# Patient Record
Sex: FEMALE | Race: BLACK OR AFRICAN AMERICAN | NOT HISPANIC OR LATINO | Employment: UNEMPLOYED | URBAN - METROPOLITAN AREA
[De-identification: names, ages, dates, MRNs, and addresses within clinical notes are randomized per-mention and may not be internally consistent; named-entity substitution may affect disease eponyms.]

---

## 2022-01-04 ENCOUNTER — TREATMENT (OUTPATIENT)
Dept: PHYSICAL THERAPY | Facility: CLINIC | Age: 62
End: 2022-01-04

## 2022-01-04 DIAGNOSIS — M25.561 PAIN IN BOTH KNEES, UNSPECIFIED CHRONICITY: ICD-10-CM

## 2022-01-04 DIAGNOSIS — M25.511 ACUTE PAIN OF BOTH SHOULDERS: Primary | ICD-10-CM

## 2022-01-04 DIAGNOSIS — M25.559 PAIN, HIP: ICD-10-CM

## 2022-01-04 DIAGNOSIS — M25.512 ACUTE PAIN OF BOTH SHOULDERS: Primary | ICD-10-CM

## 2022-01-04 DIAGNOSIS — M25.562 PAIN IN BOTH KNEES, UNSPECIFIED CHRONICITY: ICD-10-CM

## 2022-01-04 PROCEDURE — 97162 PT EVAL MOD COMPLEX 30 MIN: CPT | Performed by: PHYSICAL THERAPIST

## 2022-01-04 PROCEDURE — 97110 THERAPEUTIC EXERCISES: CPT | Performed by: PHYSICAL THERAPIST

## 2022-01-04 NOTE — PROGRESS NOTES
Physical Therapy Initial Evaluation and Plan of Care      Patient: Yaneth Crowell   : 1960  Diagnosis/ICD-10 Code:  Acute pain of both shoulders [M25.511, M25.512]  Referring practitioner: Eron Padilla Jr., MD  Date of Initial Visit: 2022  Today's Date: 2022  Patient seen for 1 sessions           Subjective Evaluation    History of Present Illness  Date of onset: 2021 (exacerbation insidious)  Mechanism of injury: Joint pain from RA  Shoulders LEFT > RIGHT and knees are the worst    RA  28 years   Then hips ankles wrist   Today is a good day   Takes meds  Pain pills and humira   Wants a exercise program to do in the water to help managme sherie  Has never done water exercise   Diabetic   Has neuropathy in feet from diabetes  Nothing in fingers  No falls but notices balance is decreasing  With quick turns   High blood pressure controlled with meds   Spine is good   Have not able to exercise bacause of RA     Subjective comment: joint pain from RA  Patient Occupation: at home moving around all day not sitting too much  Quality of life: good    Pain  Current pain ratin  At best pain ratin  At worst pain ratin  Location: shoulder flare up last wekk   Quality: dull ache, radiating, sharp and throbbing  Relieving factors: rest, heat and medications  Aggravating factors: overhead activity, stairs, repetitive movement and movement  Progression: no change (good days  bad days )    Social Support  Lives in: one-story house  Lives with: significant other and young children    Hand dominance: right    Diagnostic Tests  No diagnostic tests performed    Treatments  Current treatment: medication  Patient Goals  Patient goals for therapy: decreased pain, increased strength, return to sport/leisure activities and improved balance  Patient goal: do things around the house without exacerbation            Objective          Static Posture     Comments  POSTURE  protracted scapular bilaterally;   Increased CT junction;   RIGHT knee hyperextension LEFTlacking  TERMINAL KNEE EXTENSION  knee flexion with increasaed valgus and increased weight on RIGHT  RIGHT foot significant overly pronated   ROM  upper extremity AROM 160 bilaterally with good scapulo humeral rhythm   Functional IR reah L1 bilaterally   MMT  OK with heel and toe walking but difficulty on LEFT      upper extremity stretngth 5/5 with noted trunk compensation      LOWER EXTREMITY  Hip FLEXION  5/5  Quads 5/5 bilaterally with noted trunk compensation      SIDE LYING hip abd 3/5 with poor trunk control   TRANSFERS  Upper extremities assist  And poor anterior weight shift   SINGLE LEG STANCE RIGHT  10+  LEFT with poor weight shift and noted trunk compensation   SLR  Grossly 80 bilaterally without exacerbation   RYLEE  WNL bilaterally without exacerbation    GAIT significant flexion at knees hips and spine and poor LEFT leg stance mechanics with significant lateral SIDE FLEXION / dump             POSTURE ed  Up tall with terminal knee extension , hips underneath her and up tall thru spine   Core activation starting with pelvic floor   Wear orthotics to support LEFT over pronation   Increase walking with marching pattern to minimize LEFT SIDE FLEXION  Compensation /dump   Decompression for symptom management   Minimize sitting       Functional Outcome Score: SPADI  14/130= 14% percieved disability         Assessment & Plan     Assessment  Impairments: abnormal gait, abnormal or restricted ROM, activity intolerance, impaired balance, impaired physical strength, lacks appropriate home exercise program and pain with function  Functional Limitations: walking, uncomfortable because of pain, standing and unable to perform repetitive tasks  Assessment details: Yaneth Crowell is a 61 y.o. year-old female referred to physical therapy for mulitple joint pain flaure ups from RA . She presents with a evolving clinical presentation.  She has comorbidities  diabetes and significant LEFT foot overpronation and genu valgus  and personal factors fear of falling  that may affect her progress in the plan of care.  Signs and symptoms are consistent with physical therapy diagnosis of jont pain from RA with exacerbation from significantly altered closed chain activities from poor core strength creating compensation with all closed chain activities .   Prognosis: good    Goals  Plan Goals: STG: to be met by 6 weeks  1- Patient will report pain <  1/10 with light household activities  2-Patient will improve posture with core activation for all standing activites   3-Patient will be independent with HEP without compensation or exacerbation   LTG: to be met by 12 weeks  1-Pt will report pain < 1 /10 with recreational activities   2-Pt will increase SIDE LYING hip abd strength to at least 4/5 bilaterally   3-Patient will increase SINGLE LEG STANCE to at least 5+ sec without trunk compensation to improve quality of gait   4-Pt will be independent with comprehsive HEP     Plan  Therapy options: will be seen for skilled therapy services  Planned modality interventions: ultrasound  Planned therapy interventions: transfer training, therapeutic activities, stretching, strengthening, postural training, neuromuscular re-education, home exercise program, gait training, body mechanics training and manual therapy  Other planned therapy interventions: Aquatic therapy  Frequency: 2x week  Duration in weeks: 12  Treatment plan discussed with: patient (Diagnosis and plan of care)  Plan details: DURATION in visits 36        Timed:  Manual Therapy:    0     mins  87614;  Therapeutic Exercise:    23     mins  03854;     Neuromuscular Claudette:    0    mins  73820;    Therapeutic Activity:     0     mins  45828;     Gait Trainin     mins  18654;     Ultrasound:     0     mins  67595;    Iontophoresis    0     mins 33363  Dry Needling   0     mins /  (Self-pay)      Untimed:  Electrical  Stimulation:    0     mins  75856 ( );  Traction:  0     mins  18617;   Low Eval     0     Mins  59289  Mod Eval     20     Mins  42562  High Eval                       0     Mins  45237    Timed Treatment:   23   mins   Total Treatment:     43   mins    PT SIGNATURE: Gabby Montalvo PT     License Number: [id number]    Electronically signed by Gabby Montalvo PT, 01/04/22, 10:50 AM EST    DATE TREATMENT INITIATED: 1/5/2022    Initial Certification  Certification Period: 4/5/2022  I certify that the therapy services are furnished while this patient is under my care.  The services outlined above are required by this patient, and will be reviewed every 90 days.     PHYSICIAN: Eron Padilla Jr., MD      DATE:     Please sign and return via fax to [phone] Thank you, [facility].

## 2022-01-11 ENCOUNTER — TREATMENT (OUTPATIENT)
Dept: PHYSICAL THERAPY | Facility: CLINIC | Age: 62
End: 2022-01-11

## 2022-01-11 DIAGNOSIS — M25.561 PAIN IN BOTH KNEES, UNSPECIFIED CHRONICITY: ICD-10-CM

## 2022-01-11 DIAGNOSIS — M25.512 ACUTE PAIN OF BOTH SHOULDERS: Primary | ICD-10-CM

## 2022-01-11 DIAGNOSIS — M25.559 PAIN, HIP: ICD-10-CM

## 2022-01-11 DIAGNOSIS — M25.511 ACUTE PAIN OF BOTH SHOULDERS: Primary | ICD-10-CM

## 2022-01-11 DIAGNOSIS — M25.562 PAIN IN BOTH KNEES, UNSPECIFIED CHRONICITY: ICD-10-CM

## 2022-01-11 PROCEDURE — 97113 AQUATIC THERAPY/EXERCISES: CPT | Performed by: PHYSICAL THERAPIST

## 2022-01-11 NOTE — PROGRESS NOTES
Physical Therapy Daily Progress Note    Patient: Yaneth Crowell   : 1960  Diagnosis/ICD-10 Code:  Acute pain of both shoulders [M25.511, M25.512]  Referring practitioner: Eron Padilla Jr., MD  Date of Initial Visit: Type: THERAPY  Noted: 2022  Today's Date: 2022  Patient seen for 2 sessions             Subjective Evaluation    History of Present Illness    Subjective comment: Doing pretty good this morning, not really having any pain at the moment       Objective      AQUATIC EX:    Water Walk   Forward, backwards, and sideways x 2 laps ea with gentle arm movements  Stretch 1   HS stretch 20 sec x 2  Stretch 2   Piriformis 20 sec x 2  Stretch 3   -  Stretch Other 1  -  Stretch Other 2  *Next? UTR w/ noodle   Vertical Traction  -  Abdominals   *Next?   Clams    -  Hip Abd/Add   12x  Hip Ext   12x  March in Place  15x  Mini Squat   12x  Toe/Heel Raises  -  Uni-Squat   -  Uni-Clock   -  Step Ups   -  Bicycle   -  Flutter/Scissor  -  Exercise 1   -  Exercise 2   -  Exercise 3   -  Exercise 4   -  Exercise 5  -  Exercise 6  -      Assessment & Plan     Assessment    Assessment details: Patient arrived to pool area at 10:37am for her 10:30am appt., however, was not dressed for the pool so her session did not actually begin until 10:45am.  Today was her initial aquatic therapy session which included education and instruction in basic aquatic ex/activity for mobility, flexibility, and strength/stabilization.  She denied any pain at start of session and noted ex in the water did not put as much strain on her joints as they do out of the water.  PT provided demonstration and cuing throughout session for optimal posture, core/glut activation, and correct form/technique with ex/activity.  Patient opted to leave after 18 min in the water.  She denied any problem / issue when asked if everything was okay but after she got out of the water, she did come up to PT and made the comment that she didn't realize  there would be so many people in the pool not wearing masks.    Plan:  Assess response to initial aquatic session and modify/progress as appropriate.           Progress per Plan of Care           Timed:  Aquatic Therapy    18     mins 26017;    Michelle Davis PT  Physical Therapist    [place] License:  [id number]